# Patient Record
Sex: FEMALE | Race: WHITE | NOT HISPANIC OR LATINO | URBAN - METROPOLITAN AREA
[De-identification: names, ages, dates, MRNs, and addresses within clinical notes are randomized per-mention and may not be internally consistent; named-entity substitution may affect disease eponyms.]

---

## 2020-07-25 ENCOUNTER — TELEPHONE ENCOUNTER (OUTPATIENT)
Dept: URBAN - METROPOLITAN AREA CLINIC 13 | Facility: CLINIC | Age: 70
End: 2020-07-25

## 2020-07-25 RX ORDER — ESOMEPRAZOLE MAGNESIUM 40 MG
CAPSULE,DELAYED RELEASE (ENTERIC COATED) ORAL
Qty: 60 | Refills: 0 | OUTPATIENT
Start: 2013-03-13 | End: 2013-07-12

## 2020-07-25 RX ORDER — FOLIC ACID 0.8 MG
TABLET ORAL
Refills: 0 | OUTPATIENT
End: 2016-03-08

## 2020-07-26 ENCOUNTER — TELEPHONE ENCOUNTER (OUTPATIENT)
Dept: URBAN - METROPOLITAN AREA CLINIC 13 | Facility: CLINIC | Age: 70
End: 2020-07-26

## 2020-07-26 RX ORDER — ALPRAZOLAM 0.25 MG/1
TABLET ORAL
Qty: 40 | Refills: 0 | Status: ACTIVE | COMMUNITY
Start: 2012-06-12

## 2020-07-26 RX ORDER — UBIDECARENONE 30 MG
TAKE 1 TABLET DAILY CAPSULE ORAL
Refills: 0 | Status: ACTIVE | COMMUNITY

## 2020-07-26 RX ORDER — AMOXICILLIN AND CLAVULANATE POTASSIUM 875; 125 MG/1; MG/1
TABLET, FILM COATED ORAL
Qty: 20 | Refills: 0 | Status: ACTIVE | COMMUNITY
Start: 2012-12-10

## 2020-07-26 RX ORDER — OSELTAMIVIR PHOSPHATE 75 MG
CAPSULE ORAL
Qty: 10 | Refills: 0 | Status: ACTIVE | COMMUNITY
Start: 2012-12-10

## 2020-07-26 RX ORDER — VITAM B12 100 MCG
TAKE 1 TABLET DAILY AS DIRECTED TAB ORAL
Refills: 0 | Status: ACTIVE | COMMUNITY

## 2021-02-26 ENCOUNTER — LAB OUTSIDE AN ENCOUNTER (OUTPATIENT)
Dept: URBAN - METROPOLITAN AREA CLINIC 107 | Facility: CLINIC | Age: 71
End: 2021-02-26

## 2021-02-26 ENCOUNTER — WEB ENCOUNTER (OUTPATIENT)
Dept: URBAN - METROPOLITAN AREA CLINIC 107 | Facility: CLINIC | Age: 71
End: 2021-02-26

## 2021-02-26 ENCOUNTER — OFFICE VISIT (OUTPATIENT)
Dept: URBAN - METROPOLITAN AREA CLINIC 107 | Facility: CLINIC | Age: 71
End: 2021-02-26
Payer: MEDICARE

## 2021-02-26 VITALS
HEART RATE: 68 BPM | TEMPERATURE: 98.3 F | HEIGHT: 66 IN | DIASTOLIC BLOOD PRESSURE: 68 MMHG | SYSTOLIC BLOOD PRESSURE: 105 MMHG | WEIGHT: 146.6 LBS | BODY MASS INDEX: 23.56 KG/M2 | RESPIRATION RATE: 18 BRPM

## 2021-02-26 DIAGNOSIS — Z12.11 SCREENING FOR COLON CANCER: ICD-10-CM

## 2021-02-26 DIAGNOSIS — K59.09 CHRONIC CONSTIPATION: ICD-10-CM

## 2021-02-26 PROCEDURE — 99204 OFFICE O/P NEW MOD 45 MIN: CPT | Performed by: NURSE PRACTITIONER

## 2021-02-26 RX ORDER — UBIDECARENONE 30 MG
TAKE 1 TABLET DAILY CAPSULE ORAL
Refills: 0 | Status: DISCONTINUED | COMMUNITY

## 2021-02-26 RX ORDER — LINACLOTIDE 72 UG/1
1 CAPSULE AT LEAST 30 MINUTES BEFORE THE FIRST MEAL OF THE DAY ON AN EMPTY STOMACH CAPSULE, GELATIN COATED ORAL ONCE A DAY
Status: ACTIVE | COMMUNITY

## 2021-02-26 RX ORDER — ALPRAZOLAM 0.25 MG/1
1 TABLET TABLET ORAL PRN
Refills: 0 | Status: ACTIVE | COMMUNITY
Start: 2012-06-12

## 2021-02-26 RX ORDER — LINACLOTIDE 145 UG/1
1 CAPSULE AT LEAST 30 MINUTES BEFORE THE FIRST MEAL OF THE DAY ON AN EMPTY STOMACH CAPSULE, GELATIN COATED ORAL ONCE A DAY
Qty: 30 | Refills: 5 | OUTPATIENT
End: 2021-08-25

## 2021-02-26 RX ORDER — SODIUM, POTASSIUM,MAG SULFATES 17.5-3.13G
354 ML SOLUTION, RECONSTITUTED, ORAL ORAL ONCE
Qty: 354 MILLILITER | Refills: 0 | OUTPATIENT
End: 2021-02-27

## 2021-02-26 RX ORDER — AMOXICILLIN AND CLAVULANATE POTASSIUM 875; 125 MG/1; MG/1
TABLET, FILM COATED ORAL
Qty: 20 | Refills: 0 | Status: DISCONTINUED | COMMUNITY
Start: 2012-12-10

## 2021-02-26 RX ORDER — OSELTAMIVIR PHOSPHATE 75 MG
CAPSULE ORAL
Qty: 10 | Refills: 0 | Status: DISCONTINUED | COMMUNITY
Start: 2012-12-10

## 2021-02-26 RX ORDER — VITAM B12 100 MCG
TAKE 1 TABLET DAILY AS DIRECTED TAB ORAL
Refills: 0 | Status: ACTIVE | COMMUNITY

## 2021-02-26 NOTE — HPI-TODAY'S VISIT:
This is a 70-year-old female with a history of Meniere's disease, ankylosing spondylitis, duodenal ulcer, GERD, dysphagia, and constipation presenting for evaluation of change in bowel habits and rectal pain.   She was last seen in June 2016 for follow-up regarding GERD, early satiety, and constipation.  An EGD had demonstrated mild gastritis.  She was asymptomatic off therapy.  Constipation was controlled with MiraLAX.  Colonoscopy in May at that year demonstrated a fair prep and diverticulosis and hemorrhoids.  Due to the quality of her prep, screening was recommended in 2021. She reports a history of significant constipation describing "hard stools like a brick."  She has been taking MiraLAX as needed and reports it has been helpful.  For the last 4 to 5 months, she has experienced abdominal pain and bloating.  She reports urgency associated with firm stools and unpredictability regarding her bowel movements.  Her primary care physician prescribed Linzess 72 mcg (questionable strength) which she has been taking on a daily basis since November.  Initially, it was helpful.  Now, she is experiencing intermittent constipation once again and added MiraLAX twice a week as needed; symptoms are persistent.  Last week, she had sharp pain in her anal area occurring with bowel movements that persisted until her stool was complete.  Yesterday, she experienced diarrhea for the first time which has improved today. She reports a history of reflux and esophageal spasms manifested as chest pain.  She tried medication in the past which was ineffective.  When this occurs, it is relieved with sips of cold water. She resides half the year here and half year in [place].  She plans to return to [place] in May.

## 2021-02-26 NOTE — HPI-OTHER HISTORIES
Colonoscopy: 5/16/16: Fair prep, sigmoid and descending diverticulosis, nonbleeding internal hemorrhoids.  Otherwise unremarkable. Recommend repeat screening in 2021 due to prep.  EGD 4/7/16 : Inlet patch, Irregular Z line, single gastric polyp, chronic gastritis, normal examined duodenum.  Stomach body biopsy demonstrated fundic gland polyp.  Antral biopsy demonstrated chemical gastropathy.  Negative for H. pylori, test for metaplasia, dysplasia, and malignancy.

## 2021-02-27 ENCOUNTER — DASHBOARD ENCOUNTERS (OUTPATIENT)
Age: 71
End: 2021-02-27

## 2021-04-09 ENCOUNTER — OFFICE VISIT (OUTPATIENT)
Dept: URBAN - METROPOLITAN AREA SURGERY CENTER 25 | Facility: SURGERY CENTER | Age: 71
End: 2021-04-09
Payer: MEDICARE

## 2021-04-09 DIAGNOSIS — Z12.11 COLON CANCER SCREENING: ICD-10-CM

## 2021-04-09 PROCEDURE — G8907 PT DOC NO EVENTS ON DISCHARG: HCPCS | Performed by: INTERNAL MEDICINE

## 2021-04-09 PROCEDURE — G0121 COLON CA SCRN NOT HI RSK IND: HCPCS | Performed by: INTERNAL MEDICINE

## 2021-04-09 RX ORDER — VITAM B12 100 MCG
TAKE 1 TABLET DAILY AS DIRECTED TAB ORAL
Refills: 0 | Status: ACTIVE | COMMUNITY

## 2021-04-09 RX ORDER — LINACLOTIDE 145 UG/1
1 CAPSULE AT LEAST 30 MINUTES BEFORE THE FIRST MEAL OF THE DAY ON AN EMPTY STOMACH CAPSULE, GELATIN COATED ORAL ONCE A DAY
Qty: 30 | Refills: 5 | Status: ACTIVE | COMMUNITY
End: 2021-08-25

## 2021-04-09 RX ORDER — ALPRAZOLAM 0.25 MG/1
1 TABLET TABLET ORAL PRN
Refills: 0 | Status: ACTIVE | COMMUNITY
Start: 2012-06-12

## 2021-04-09 RX ORDER — LINACLOTIDE 72 UG/1
1 CAPSULE AT LEAST 30 MINUTES BEFORE THE FIRST MEAL OF THE DAY ON AN EMPTY STOMACH CAPSULE, GELATIN COATED ORAL ONCE A DAY
Status: ACTIVE | COMMUNITY

## 2021-09-30 ENCOUNTER — TELEPHONE ENCOUNTER (OUTPATIENT)
Dept: URBAN - METROPOLITAN AREA CLINIC 92 | Facility: CLINIC | Age: 71
End: 2021-09-30

## 2021-09-30 RX ORDER — LINACLOTIDE 145 UG/1
1 CAPSULE AT LEAST 30 MINUTES BEFORE THE FIRST MEAL OF THE DAY ON AN EMPTY STOMACH CAPSULE, GELATIN COATED ORAL ONCE A DAY
Qty: 30 | Refills: 5
End: 2022-03-29

## 2022-10-31 ENCOUNTER — ERX REFILL RESPONSE (OUTPATIENT)
Dept: URBAN - METROPOLITAN AREA CLINIC 107 | Facility: CLINIC | Age: 72
End: 2022-10-31

## 2022-10-31 RX ORDER — LINACLOTIDE 145 UG/1
TAKE 1 CAPSULE BY MOUTH AT LEAST 30MIN BEFORE FIRST MEAL OF THE DAY *ON EMPTY STOMACH* CAPSULE, GELATIN COATED ORAL
Qty: 90 CAPSULE | Refills: 0 | OUTPATIENT

## 2023-01-30 ENCOUNTER — ERX REFILL RESPONSE (OUTPATIENT)
Dept: URBAN - METROPOLITAN AREA CLINIC 107 | Facility: CLINIC | Age: 73
End: 2023-01-30

## 2023-01-30 RX ORDER — LINACLOTIDE 145 UG/1
TAKE 1 CAPSULE BY MOUTH AT LEAST 30MIN BEFORE FIRST MEAL OF THE DAY *ON EMPTY STOMACH* CAPSULE, GELATIN COATED ORAL
Qty: 90 CAPSULE | Refills: 0 | OUTPATIENT

## 2023-05-01 NOTE — PHYSICAL EXAM EYES:
Conjuntivae and eyelids appear normal,  Sclerae : no icteris Quality 431: Preventive Care And Screening: Unhealthy Alcohol Use - Screening: Patient not identified as an unhealthy alcohol user when screened for unhealthy alcohol use using a systematic screening method Quality 47: Advance Care Plan: Advance Care Planning discussed and documented; advance care plan or surrogate decision maker documented in the medical record. Detail Level: Detailed Quality 226: Preventive Care And Screening: Tobacco Use: Screening And Cessation Intervention: Patient screened for tobacco use and is an ex/non-smoker